# Patient Record
Sex: MALE | Race: WHITE
[De-identification: names, ages, dates, MRNs, and addresses within clinical notes are randomized per-mention and may not be internally consistent; named-entity substitution may affect disease eponyms.]

---

## 2017-08-15 ENCOUNTER — HOSPITAL ENCOUNTER (OUTPATIENT)
Dept: HOSPITAL 47 - LABPAT | Age: 62
Discharge: HOME | End: 2017-08-15
Attending: NEUROLOGICAL SURGERY
Payer: COMMERCIAL

## 2017-08-15 DIAGNOSIS — Z01.818: Primary | ICD-10-CM

## 2017-08-15 DIAGNOSIS — I44.0: ICD-10-CM

## 2017-08-15 DIAGNOSIS — M48.06: ICD-10-CM

## 2017-08-15 LAB
ALP SERPL-CCNC: 52 U/L (ref 38–126)
ALT SERPL-CCNC: 33 U/L (ref 21–72)
ANION GAP SERPL CALC-SCNC: 7 MMOL/L
APTT BLD: 25.6 SEC (ref 22–30)
AST SERPL-CCNC: 20 U/L (ref 17–59)
BUN SERPL-SCNC: 13 MG/DL (ref 9–20)
CALCIUM SPEC-MCNC: 9.2 MG/DL (ref 8.4–10.2)
CH: 32.3
CHCM: 33.6
CHLORIDE SERPL-SCNC: 108 MMOL/L (ref 98–107)
CO2 SERPL-SCNC: 27 MMOL/L (ref 22–30)
EKG: (no result)
ERYTHROCYTE [DISTWIDTH] IN BLOOD BY AUTOMATED COUNT: 5.25 M/UL (ref 4.3–5.9)
ERYTHROCYTE [DISTWIDTH] IN BLOOD: 14 % (ref 11.5–15.5)
GLUCOSE SERPL-MCNC: 101 MG/DL (ref 74–99)
HCT VFR BLD AUTO: 50.7 % (ref 39–53)
HDW: 2.33
HGB BLD-MCNC: 16.6 GM/DL (ref 13–17.5)
INR PPP: 1.1 (ref ?–1.2)
MCH RBC QN AUTO: 31.6 PG (ref 25–35)
MCHC RBC AUTO-ENTMCNC: 32.7 G/DL (ref 31–37)
MCV RBC AUTO: 96.7 FL (ref 80–100)
NON-AFRICAN AMERICAN GFR(MDRD): >60
PARTICLE COUNT: 1111
PH UR: 5.5 [PH] (ref 5–8)
POTASSIUM SERPL-SCNC: 4.4 MMOL/L (ref 3.5–5.1)
PROT SERPL-MCNC: 6.6 G/DL (ref 6.3–8.2)
PT BLD: 10.8 SEC (ref 9–12)
RBC UR QL: 1 /HPF (ref 0–5)
SODIUM SERPL-SCNC: 142 MMOL/L (ref 137–145)
SP GR UR: 1.01 (ref 1–1.03)
UA BILLING (MACRO VS. MICRO): (no result)
UROBILINOGEN UR QL STRIP: <2 MG/DL (ref ?–2)
WBC # BLD AUTO: 5.6 K/UL (ref 3.8–10.6)

## 2017-08-15 PROCEDURE — 71020: CPT

## 2017-08-15 PROCEDURE — 93005 ELECTROCARDIOGRAM TRACING: CPT

## 2017-08-15 PROCEDURE — 87070 CULTURE OTHR SPECIMN AEROBIC: CPT

## 2017-08-15 PROCEDURE — 81001 URINALYSIS AUTO W/SCOPE: CPT

## 2017-08-15 PROCEDURE — 85730 THROMBOPLASTIN TIME PARTIAL: CPT

## 2017-08-15 PROCEDURE — 85027 COMPLETE CBC AUTOMATED: CPT

## 2017-08-15 PROCEDURE — 87086 URINE CULTURE/COLONY COUNT: CPT

## 2017-08-15 PROCEDURE — 85610 PROTHROMBIN TIME: CPT

## 2017-08-15 PROCEDURE — 80053 COMPREHEN METABOLIC PANEL: CPT

## 2017-08-15 NOTE — XR
EXAMINATION TYPE: XR chest 2V

 

DATE OF EXAM: 8/15/2017

 

COMPARISON: Prior chest x-ray 7/9/2015

 

HISTORY: Preop

 

TECHNIQUE:  Frontal and lateral views of the chest are obtained.

 

FINDINGS:  There is no focal air space opacity, pleural effusion, or pneumothorax seen.  The cardiac 
silhouette size is within normal limits.  There are prominent lung volumes. The osseous structures ar
e intact.

 

IMPRESSION:  No acute cardiopulmonary process.

## 2017-10-04 ENCOUNTER — HOSPITAL ENCOUNTER (OUTPATIENT)
Dept: HOSPITAL 47 - RADXRMAIN | Age: 62
Discharge: HOME | End: 2017-10-04
Attending: NEUROLOGICAL SURGERY
Payer: COMMERCIAL

## 2017-10-04 DIAGNOSIS — Z98.890: ICD-10-CM

## 2017-10-04 DIAGNOSIS — G89.29: ICD-10-CM

## 2017-10-04 DIAGNOSIS — M54.41: Primary | ICD-10-CM

## 2017-10-04 DIAGNOSIS — M54.42: ICD-10-CM

## 2017-10-04 DIAGNOSIS — Z98.1: ICD-10-CM

## 2017-10-04 PROCEDURE — 72100 X-RAY EXAM L-S SPINE 2/3 VWS: CPT

## 2018-03-05 ENCOUNTER — HOSPITAL ENCOUNTER (OUTPATIENT)
Dept: HOSPITAL 47 - RADXRMAIN | Age: 63
Discharge: HOME | End: 2018-03-05
Attending: NEUROLOGICAL SURGERY
Payer: COMMERCIAL

## 2018-03-05 DIAGNOSIS — G89.29: ICD-10-CM

## 2018-03-05 DIAGNOSIS — M54.41: Primary | ICD-10-CM

## 2018-03-05 DIAGNOSIS — M54.42: ICD-10-CM

## 2018-03-05 DIAGNOSIS — Z98.890: ICD-10-CM

## 2018-03-05 DIAGNOSIS — Z98.1: ICD-10-CM

## 2018-03-05 PROCEDURE — 72100 X-RAY EXAM L-S SPINE 2/3 VWS: CPT

## 2018-03-06 NOTE — XR
EXAM TYPE: LUMBAR SPINE X RAY SERIES

 

COMPARISON: NONE

 

HISTORY: Postop

 

TECHNIQUE: 3 views are submitted.

 

FINDINGS:

Alignment is anatomic. Postsurgical change levels L4-S1 appears stable. No evidence of spondylolisthe
sis.

 

Hypertrophic change and mild degenerative disc disease at remaining levels large spurs anteriorly. Re
maining pedicles intact. Surgical clips in the gallbladder fossa.

 

IMPRESSION:

1. Stable postoperative change